# Patient Record
Sex: MALE | Race: BLACK OR AFRICAN AMERICAN | URBAN - METROPOLITAN AREA
[De-identification: names, ages, dates, MRNs, and addresses within clinical notes are randomized per-mention and may not be internally consistent; named-entity substitution may affect disease eponyms.]

---

## 2020-06-04 NOTE — PATIENT DISCUSSION
"""Followed by Dr. Fletcher [place] Hx of injections OD.  No injections in past year""" SW received a call from Rangel at [facility] ([phone]) who stated they can likely accept pt tomorrow. Rangel will come to pt's bedside at [facility] today to evaluate and will provide SW with final answer after evaluation.    Carleen Russo, EMILY  [phone]

## 2020-07-22 NOTE — PATIENT DISCUSSION
"""S/P IOL OS: Tecnis ZCB00 28.5 (ORA) +ORA/Femto/Arcs +Omidria. Continue post operative instructions and drops per schedule.  """

## 2021-12-14 NOTE — PROCEDURE NOTE: CLINICAL
PROCEDURE NOTE: YAG Capsulotomy OS. Diagnosis: Posterior Capsular Opacification (PCO). Prep: Mydriacil 1% and Phenylephrine 2.5%. Prior to treatment, the risks/benefits/alternatives were discussed. The patient wished to proceed with procedure. Consent was signed. Proparacaine and brominidine were placed into the operative eye after the eye was dilated. Power = 1.1mJ. Number of pulses = 61. Patient tolerated procedure well and there were no complications. Post Laser instructions given. Queen Julio Cesar

## 2021-12-14 NOTE — PATIENT DISCUSSION
PCO ([id number]): Visually significant PCO present on exam today. Recommend YAG laser capsulotomy to improve vision and decrease glare symptoms. RBAs of procedure discussed. Patient agrees and wishes to proceed.

## 2021-12-14 NOTE — PATIENT DISCUSSION
History of Avastin and Eylea injections OD with [facility]/Dr. Ivan Donovan. Advised to keep regular scheduled visits.

## 2022-02-01 NOTE — PATIENT DISCUSSION
History of Avastin and Eylea injections OD with [facility]/Dr. Yamileth Diaz. Advised to keep regular scheduled visits.

## 2022-08-30 ENCOUNTER — ESTABLISHED PATIENT (OUTPATIENT)
Facility: LOCATION | Age: 87
End: 2022-08-30

## 2022-08-30 DIAGNOSIS — H34.211: ICD-10-CM

## 2022-08-30 DIAGNOSIS — H16.223: ICD-10-CM

## 2022-08-30 DIAGNOSIS — Z96.1: ICD-10-CM

## 2022-08-30 DIAGNOSIS — E11.9: ICD-10-CM

## 2022-08-30 PROCEDURE — 92014 COMPRE OPH EXAM EST PT 1/>: CPT

## 2022-08-30 ASSESSMENT — VISUAL ACUITY
OD_SC: J5-2
OD_SC: 20/30-2
OS_SC: J5+2
OS_SC: 20/30-1

## 2022-08-30 ASSESSMENT — TONOMETRY
OS_IOP_MMHG: 11
OD_IOP_MMHG: 13

## 2023-01-09 NOTE — PATIENT DISCUSSION
History of Avastin and Eylea injections OD with [facility]/Dr. Marice Siemens. Advised to keep regular scheduled visits.

## 2023-04-18 ENCOUNTER — ESTABLISHED PATIENT (OUTPATIENT)
Facility: LOCATION | Age: 88
End: 2023-04-18

## 2023-04-18 DIAGNOSIS — H34.211: ICD-10-CM

## 2023-04-18 DIAGNOSIS — E11.9: ICD-10-CM

## 2023-04-18 DIAGNOSIS — H16.223: ICD-10-CM

## 2023-04-18 DIAGNOSIS — Z96.1: ICD-10-CM

## 2023-04-18 PROCEDURE — 92014 COMPRE OPH EXAM EST PT 1/>: CPT

## 2023-04-18 ASSESSMENT — VISUAL ACUITY
OS_SC: 20/30-2
OD_SC: J7
OD_SC: 20/30-2
OS_SC: J7

## 2023-04-18 ASSESSMENT — TONOMETRY
OS_IOP_MMHG: 11
OD_IOP_MMHG: 10